# Patient Record
Sex: MALE | Race: WHITE
[De-identification: names, ages, dates, MRNs, and addresses within clinical notes are randomized per-mention and may not be internally consistent; named-entity substitution may affect disease eponyms.]

---

## 2018-03-28 ENCOUNTER — HOSPITAL ENCOUNTER (EMERGENCY)
Dept: HOSPITAL 63 - ER | Age: 33
Discharge: ANOTHER HOSPITAL | End: 2018-03-28
Payer: COMMERCIAL

## 2018-03-28 VITALS — HEIGHT: 71 IN | WEIGHT: 180 LBS | BODY MASS INDEX: 25.2 KG/M2

## 2018-03-28 VITALS — DIASTOLIC BLOOD PRESSURE: 71 MMHG | SYSTOLIC BLOOD PRESSURE: 134 MMHG

## 2018-03-28 DIAGNOSIS — S01.511A: Primary | ICD-10-CM
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
## 2018-03-28 PROCEDURE — 90471 IMMUNIZATION ADMIN: CPT

## 2018-03-28 PROCEDURE — 90715 TDAP VACCINE 7 YRS/> IM: CPT

## 2018-03-28 PROCEDURE — 12013 RPR F/E/E/N/L/M 2.6-5.0 CM: CPT
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
## 2021-12-20 ENCOUNTER — HOSPITAL ENCOUNTER (EMERGENCY)
Dept: HOSPITAL 96 - M.ERS | Age: 36
Discharge: HOME | End: 2021-12-20
Payer: COMMERCIAL

## 2021-12-20 VITALS — HEIGHT: 71 IN | WEIGHT: 155.01 LBS | BODY MASS INDEX: 21.7 KG/M2

## 2021-12-20 VITALS — DIASTOLIC BLOOD PRESSURE: 77 MMHG | SYSTOLIC BLOOD PRESSURE: 134 MMHG

## 2021-12-20 DIAGNOSIS — M54.50: ICD-10-CM

## 2021-12-20 DIAGNOSIS — R10.9: Primary | ICD-10-CM

## 2021-12-20 LAB
ABSOLUTE BASOPHILS: 0.1 THOU/UL (ref 0–0.2)
ABSOLUTE EOSINOPHILS: 0.3 THOU/UL (ref 0–0.7)
ABSOLUTE MONOCYTES: 0.5 THOU/UL (ref 0–1.2)
ALBUMIN SERPL-MCNC: 3.5 G/DL (ref 3.4–5)
ALP SERPL-CCNC: 108 U/L (ref 46–116)
ALT SERPL-CCNC: 21 U/L (ref 30–65)
ANION GAP SERPL CALC-SCNC: 5 MMOL/L (ref 7–16)
AST SERPL-CCNC: 13 U/L (ref 15–37)
BASOPHILS NFR BLD AUTO: 0.8 %
BILIRUB SERPL-MCNC: 0.2 MG/DL
BILIRUB UR-MCNC: NEGATIVE MG/DL
BUN SERPL-MCNC: 15 MG/DL (ref 7–18)
CALCIUM SERPL-MCNC: 8.5 MG/DL (ref 8.5–10.1)
CHLORIDE SERPL-SCNC: 103 MMOL/L (ref 98–107)
CO2 SERPL-SCNC: 31 MMOL/L (ref 21–32)
COLOR UR: YELLOW
CREAT SERPL-MCNC: 1 MG/DL (ref 0.6–1.3)
EOSINOPHIL NFR BLD: 3.9 %
GLUCOSE SERPL-MCNC: 93 MG/DL (ref 70–99)
GRANULOCYTES NFR BLD MANUAL: 50.4 %
HCT VFR BLD CALC: 40.2 % (ref 42–52)
HGB BLD-MCNC: 13.4 GM/DL (ref 14–18)
KETONES UR STRIP-MCNC: NEGATIVE MG/DL
LIPASE: 168 U/L (ref 73–393)
LYMPHOCYTES # BLD: 2.7 THOU/UL (ref 0.8–5.3)
LYMPHOCYTES NFR BLD AUTO: 38.2 %
MCH RBC QN AUTO: 29.3 PG (ref 26–34)
MCHC RBC AUTO-ENTMCNC: 33.3 G/DL (ref 28–37)
MCV RBC: 87.7 FL (ref 80–100)
MONOCYTES NFR BLD: 6.7 %
MPV: 6.6 FL. (ref 7.2–11.1)
NEUTROPHILS # BLD: 3.6 THOU/UL (ref 1.6–8.1)
NUCLEATED RBCS: 0 /100WBC
PLATELET COUNT*: 250 THOU/UL (ref 150–400)
POTASSIUM SERPL-SCNC: 3.6 MMOL/L (ref 3.5–5.1)
PROT SERPL-MCNC: 6.6 G/DL (ref 6.4–8.2)
PROT UR QL STRIP: NEGATIVE
RBC # BLD AUTO: 4.58 MIL/UL (ref 4.5–6)
RBC # UR STRIP: NEGATIVE /UL
RDW-CV: 13.6 % (ref 10.5–14.5)
SODIUM SERPL-SCNC: 139 MMOL/L (ref 136–145)
SP GR UR STRIP: 1.02 (ref 1–1.03)
URINE CLARITY: CLEAR
URINE GLUCOSE-RANDOM: NEGATIVE
URINE LEUKOCYTES-REFLEX: NEGATIVE
URINE NITRITE-REFLEX: NEGATIVE
UROBILINOGEN UR STRIP-ACNC: 0.2 E.U./DL (ref 0.2–1)
WBC # BLD AUTO: 7.1 THOU/UL (ref 4–11)

## 2021-12-20 NOTE — EKG
[place] [address]
Phone:  [phone]                     ELECTROCARDIOGRAM REPORT      
_______________________________________________________________________________
 
Name:         DONNA DANIEL                Room:                     [facility]#:    [id number]     Account #:     [id number]  
Admission:    21    Attend Phys:                     
Discharge:    21    Date of Birth: [birth year]  
Date of Service: 21 0835  Report #:      [id number]
        [id number]
_______________________________________________________________________________
THIS REPORT FOR:  //name//                      
 
                         [facility] ED
                                       
Test Date:    2021               Test Time:    08:35:19
Pat Name:     DONNA DANIEL             Department:   
Patient ID:   [id number]            Room:          
Gender:                               Technician:   
:          [id number]               Requested By: Saud Vicente
Order Number: [id number] MD:   Cash Mar
                                 Measurements
Intervals                              Axis          
Rate:         80                       P:            76
TX:           138                      QRS:          82
QRSD:         98                       T:            57
QT:           372                                    
QTc:          430                                    
                           Interpretive Statements
Sinus rhythm
RSR' in V1 or V2, right VCD or RVH
No previous ECG available for comparison
Electronically Signed On 2021 10:26:28 CST by Cash Mar
[email or web address]
 
 
 
 
 
 
 
 
 
 
 
 
 
 
 
 
 
 
 
 
 
  <ELECTRONICALLY SIGNED>
                                           By: Cash Mar MD, [facility]      
  21     1026
D: 21 0835   _____________________________________
T: 21 0835   Cash Mar MD, [facility]        /EPI